# Patient Record
Sex: FEMALE | Race: WHITE | NOT HISPANIC OR LATINO | Employment: OTHER | URBAN - NONMETROPOLITAN AREA
[De-identification: names, ages, dates, MRNs, and addresses within clinical notes are randomized per-mention and may not be internally consistent; named-entity substitution may affect disease eponyms.]

---

## 2023-04-28 ENCOUNTER — HOSPITAL ENCOUNTER (EMERGENCY)
Facility: HOSPITAL | Age: 83
Discharge: ANOTHER HOSPITAL | End: 2023-04-28
Attending: EMERGENCY MEDICINE

## 2023-04-28 ENCOUNTER — APPOINTMENT (EMERGENCY)
Dept: CT IMAGING | Facility: HOSPITAL | Age: 83
End: 2023-04-28

## 2023-04-28 VITALS
DIASTOLIC BLOOD PRESSURE: 100 MMHG | SYSTOLIC BLOOD PRESSURE: 201 MMHG | OXYGEN SATURATION: 97 % | HEIGHT: 61 IN | TEMPERATURE: 97.4 F | BODY MASS INDEX: 31.97 KG/M2 | RESPIRATION RATE: 17 BRPM | HEART RATE: 81 BPM | WEIGHT: 169.31 LBS

## 2023-04-28 DIAGNOSIS — R10.9 ABDOMINAL PAIN: Primary | ICD-10-CM

## 2023-04-28 DIAGNOSIS — K85.90 ACUTE PANCREATITIS WITHOUT INFECTION OR NECROSIS, UNSPECIFIED PANCREATITIS TYPE: ICD-10-CM

## 2023-04-28 DIAGNOSIS — R11.0 NAUSEA: ICD-10-CM

## 2023-04-28 DIAGNOSIS — R74.01 ELEVATED AST (SGOT): ICD-10-CM

## 2023-04-28 LAB
ALBUMIN SERPL BCP-MCNC: 3.8 G/DL (ref 3.5–5)
ALP SERPL-CCNC: 84 U/L (ref 34–104)
ALT SERPL W P-5'-P-CCNC: 45 U/L (ref 7–52)
ANION GAP SERPL CALCULATED.3IONS-SCNC: 12 MMOL/L (ref 4–13)
AST SERPL W P-5'-P-CCNC: 123 U/L (ref 13–39)
BACTERIA UR QL AUTO: ABNORMAL /HPF
BASOPHILS # BLD AUTO: 0.04 THOUSANDS/ΜL (ref 0–0.1)
BASOPHILS NFR BLD AUTO: 0 % (ref 0–1)
BILIRUB SERPL-MCNC: 0.7 MG/DL (ref 0.2–1)
BILIRUB UR QL STRIP: NEGATIVE
BUN SERPL-MCNC: 82 MG/DL (ref 5–25)
CALCIUM SERPL-MCNC: 8.8 MG/DL (ref 8.4–10.2)
CARDIAC TROPONIN I PNL SERPL HS: 8 NG/L
CHLORIDE SERPL-SCNC: 100 MMOL/L (ref 96–108)
CLARITY UR: CLEAR
CO2 SERPL-SCNC: 20 MMOL/L (ref 21–32)
COLOR UR: YELLOW
CREAT SERPL-MCNC: 5.05 MG/DL (ref 0.6–1.3)
EOSINOPHIL # BLD AUTO: 0.04 THOUSAND/ΜL (ref 0–0.61)
EOSINOPHIL NFR BLD AUTO: 0 % (ref 0–6)
ERYTHROCYTE [DISTWIDTH] IN BLOOD BY AUTOMATED COUNT: 13.1 % (ref 11.6–15.1)
GFR SERPL CREATININE-BSD FRML MDRD: 7 ML/MIN/1.73SQ M
GLUCOSE SERPL-MCNC: 179 MG/DL (ref 65–140)
GLUCOSE UR STRIP-MCNC: ABNORMAL MG/DL
HCT VFR BLD AUTO: 28 % (ref 34.8–46.1)
HGB BLD-MCNC: 9.3 G/DL (ref 11.5–15.4)
HGB UR QL STRIP.AUTO: ABNORMAL
IMM GRANULOCYTES # BLD AUTO: 0.05 THOUSAND/UL (ref 0–0.2)
IMM GRANULOCYTES NFR BLD AUTO: 0 % (ref 0–2)
KETONES UR STRIP-MCNC: NEGATIVE MG/DL
LACTATE SERPL-SCNC: 0.8 MMOL/L (ref 0.5–2)
LEUKOCYTE ESTERASE UR QL STRIP: NEGATIVE
LIPASE SERPL-CCNC: 845 U/L (ref 11–82)
LYMPHOCYTES # BLD AUTO: 0.76 THOUSANDS/ΜL (ref 0.6–4.47)
LYMPHOCYTES NFR BLD AUTO: 7 % (ref 14–44)
MCH RBC QN AUTO: 31.3 PG (ref 26.8–34.3)
MCHC RBC AUTO-ENTMCNC: 33.2 G/DL (ref 31.4–37.4)
MCV RBC AUTO: 94 FL (ref 82–98)
MONOCYTES # BLD AUTO: 0.78 THOUSAND/ΜL (ref 0.17–1.22)
MONOCYTES NFR BLD AUTO: 7 % (ref 4–12)
NEUTROPHILS # BLD AUTO: 9.68 THOUSANDS/ΜL (ref 1.85–7.62)
NEUTS SEG NFR BLD AUTO: 86 % (ref 43–75)
NITRITE UR QL STRIP: NEGATIVE
NON-SQ EPI CELLS URNS QL MICRO: ABNORMAL /HPF
NRBC BLD AUTO-RTO: 0 /100 WBCS
PH UR STRIP.AUTO: 7 [PH]
PLATELET # BLD AUTO: 189 THOUSANDS/UL (ref 149–390)
PMV BLD AUTO: 11.7 FL (ref 8.9–12.7)
POTASSIUM SERPL-SCNC: 4.2 MMOL/L (ref 3.5–5.3)
PROT SERPL-MCNC: 7.5 G/DL (ref 6.4–8.4)
PROT UR STRIP-MCNC: ABNORMAL MG/DL
RBC # BLD AUTO: 2.97 MILLION/UL (ref 3.81–5.12)
RBC #/AREA URNS AUTO: ABNORMAL /HPF
SODIUM SERPL-SCNC: 132 MMOL/L (ref 135–147)
SP GR UR STRIP.AUTO: 1.01 (ref 1–1.03)
UROBILINOGEN UR QL STRIP.AUTO: 0.2 E.U./DL
WBC # BLD AUTO: 11.35 THOUSAND/UL (ref 4.31–10.16)
WBC #/AREA URNS AUTO: ABNORMAL /HPF

## 2023-04-28 RX ORDER — FERROUS SULFATE 7.5 MG/0.5
45 SYRINGE (EA) ORAL SEE ADMIN INSTRUCTIONS
COMMUNITY

## 2023-04-28 RX ORDER — BETAMETHASONE DIPROPIONATE 0.05 %
1 OINTMENT (GRAM) TOPICAL AS NEEDED
COMMUNITY

## 2023-04-28 RX ORDER — OMEGA-3S/DHA/EPA/FISH OIL/D3 300MG-1000
400 CAPSULE ORAL 2 TIMES DAILY
COMMUNITY

## 2023-04-28 RX ORDER — LOPERAMIDE HYDROCHLORIDE 2 MG/1
2 CAPSULE ORAL AS NEEDED
COMMUNITY

## 2023-04-28 RX ORDER — CHLORDIAZEPOXIDE HYDROCHLORIDE 5 MG/1
5 CAPSULE, GELATIN COATED ORAL 2 TIMES DAILY
COMMUNITY

## 2023-04-28 RX ORDER — ALLOPURINOL 100 MG/1
100 TABLET ORAL DAILY
COMMUNITY

## 2023-04-28 RX ORDER — NITROGLYCERIN 0.4 MG/1
0.4 TABLET SUBLINGUAL
COMMUNITY

## 2023-04-28 RX ORDER — FAMOTIDINE 10 MG/ML
20 INJECTION, SOLUTION INTRAVENOUS ONCE
Status: COMPLETED | OUTPATIENT
Start: 2023-04-28 | End: 2023-04-28

## 2023-04-28 RX ORDER — EPINEPHRINE 0.3 MG/.3ML
0.15 INJECTION SUBCUTANEOUS ONCE
COMMUNITY

## 2023-04-28 RX ORDER — FAMOTIDINE 20 MG/1
20 TABLET, FILM COATED ORAL DAILY
COMMUNITY

## 2023-04-28 RX ORDER — FLUTICASONE PROPIONATE 50 MCG
2 SPRAY, SUSPENSION (ML) NASAL DAILY
COMMUNITY

## 2023-04-28 RX ORDER — HYDROCHLOROTHIAZIDE 25 MG/1
25 TABLET ORAL DAILY
COMMUNITY

## 2023-04-28 RX ORDER — ONDANSETRON 2 MG/ML
4 INJECTION INTRAMUSCULAR; INTRAVENOUS ONCE
Status: COMPLETED | OUTPATIENT
Start: 2023-04-28 | End: 2023-04-28

## 2023-04-28 RX ORDER — CALCIPOTRIENE 50 UG/G
1 CREAM TOPICAL 2 TIMES DAILY
COMMUNITY

## 2023-04-28 RX ADMIN — FAMOTIDINE 20 MG: 10 INJECTION INTRAVENOUS at 01:37

## 2023-04-28 RX ADMIN — MORPHINE SULFATE 2 MG: 2 INJECTION, SOLUTION INTRAMUSCULAR; INTRAVENOUS at 05:09

## 2023-04-28 RX ADMIN — ONDANSETRON 4 MG: 2 INJECTION INTRAMUSCULAR; INTRAVENOUS at 01:37

## 2023-04-28 RX ADMIN — ONDANSETRON 4 MG: 2 INJECTION INTRAMUSCULAR; INTRAVENOUS at 05:08

## 2023-04-28 NOTE — ED PROVIDER NOTES
History  Chief Complaint   Patient presents with   • Abdominal Pain     Started with increased upper abd pain around 9pm  Pt has hx of hiatal hernia that cannot be operated on  Denies n/v/d  Patient is an 33-year-old female presenting to the emergency department complaining of epigastric abdominal pain with nausea that started around 9 PM, she ate dinner okay without any difficulty, she denies any fever or chills, no sick contacts, no questionable food intake, no difficulty or pain with urination, she reports normal bowel movements without constipation or diarrhea, patient has a known history of a large hiatal hernia that is inoperable, she recently had an AV fistula placed in the left upper extremity for chronic kidney disease, not currently on dialysis          Prior to Admission Medications   Prescriptions Last Dose Informant Patient Reported? Taking? EPINEPHrine (EPIPEN) 0 3 mg/0 3 mL SOAJ   Yes Yes   Sig: Inject 0 15 mg into a muscle once   Magnesium Oxide POWD 4/28/2023  Yes Yes   Sig: Take 400 mg by mouth in the morning 1/4 teaspoon daily   PROBIOTIC PRODUCT PO 4/28/2023  Yes Yes   Sig: Take 0 5 mg by mouth daily in the early morning   allopurinol (ZYLOPRIM) 100 mg tablet 4/28/2023  Yes Yes   Sig: Take 100 mg by mouth daily   betamethasone dipropionate (DIPROSONE) 0 05 % ointment   Yes Yes   Sig: Apply 1 application  topically if needed   calcipotriene (DOVONEX) 0 005 % cream   Yes Yes   Sig: Apply 1 application   topically 2 (two) times a day Monday thru Friday   chlordiazePOXIDE (LIBRIUM) 5 mg capsule 4/28/2023  Yes Yes   Sig: Take 5 mg by mouth 2 (two) times a day   cholecalciferol (VITAMIN D3) 400 units tablet 4/28/2023  Yes Yes   Sig: Take 400 Units by mouth 2 (two) times a day   dextromethorphan-guaifenesin (MUCINEX DM)  MG per 12 hr tablet   Yes Yes   Sig: Take 1 tablet by mouth daily as needed for cough   famotidine (PEPCID) 20 mg tablet 4/28/2023  Yes Yes   Sig: Take 20 mg by mouth daily ferrous sulfate (ALPHONSO-IN-SOL) 75 (15 Fe) mg/mL drops   Yes Yes   Sig: Take 45 mg of iron by mouth see administration instructions Every Monday, Wednesday, Friday   fluticasone (FLONASE) 50 mcg/act nasal spray   Yes Yes   Si sprays into each nostril daily   hydrochlorothiazide (HYDRODIURIL) 25 mg tablet 2023  Yes Yes   Sig: Take 25 mg by mouth daily   loperamide (IMODIUM) 2 mg capsule   Yes Yes   Sig: Take 2 mg by mouth as needed for diarrhea   nitroglycerin (NITROSTAT) 0 4 mg SL tablet   Yes Yes   Sig: Place 0 4 mg under the tongue every 5 (five) minutes as needed for chest pain      Facility-Administered Medications: None       Past Medical History:   Diagnosis Date   • Basal cell carcinoma     left lower eyelid   • Basal cell carcinoma     left nare   • Cataract    • Chronic kidney disease    • Colon cancer ([facility] [address] )    • Diabetes mellitus ([facility] )    • Diverticulitis    • Foot fracture, left    • Hiatal hernia with GERD    • Psoriasis        Past Surgical History:   Procedure Laterality Date   • AV FISTULA PLACEMENT Left    • COLONOSCOPY     • POLYPECTOMY     • SKIN CANCER EXCISION      left lower eyelid   • SKIN CANCER EXCISION      left nare       History reviewed  No pertinent family history  I have reviewed and agree with the history as documented  E-Cigarette/Vaping     E-Cigarette/Vaping Substances     Social History     Tobacco Use   • Smoking status: Never   • Smokeless tobacco: Never   Substance Use Topics   • Alcohol use: Never   • Drug use: Never       Review of Systems   Constitutional: Negative  HENT: Negative  Eyes: Negative  Respiratory: Negative  Cardiovascular: Negative  Gastrointestinal: Positive for abdominal pain and nausea  Endocrine: Negative  Genitourinary: Negative  Musculoskeletal: Negative  Skin: Negative  Allergic/Immunologic: Negative  Neurological: Negative  Hematological: Negative  Psychiatric/Behavioral: Negative          Physical Exam  Physical Exam  Constitutional:       Appearance: She is well-developed  HENT:      Head: Normocephalic and atraumatic  Eyes:      Pupils: Pupils are equal, round, and reactive to light  Cardiovascular:      Rate and Rhythm: Normal rate and regular rhythm  Pulmonary:      Breath sounds: Normal breath sounds  Abdominal:      Palpations: Abdomen is soft  Tenderness: There is abdominal tenderness in the epigastric area  Musculoskeletal:         General: Normal range of motion  Cervical back: Normal range of motion and neck supple  Skin:     General: Skin is warm and dry  Neurological:      Mental Status: She is alert     Psychiatric:         Behavior: Behavior normal          Vital Signs  ED Triage Vitals   Temperature Pulse Respirations Blood Pressure SpO2   04/28/23 0120 04/28/23 0120 04/28/23 0120 04/28/23 0200 04/28/23 0120   (!) 97 4 °F (36 3 °C) 66 18 167/74 99 %      Temp Source Heart Rate Source Patient Position - Orthostatic VS BP Location FiO2 (%)   04/28/23 0120 04/28/23 0120 04/28/23 0120 04/28/23 0120 --   Temporal Monitor Lying Right arm       Pain Score       04/28/23 0120       7           Vitals:    04/28/23 0120 04/28/23 0200 04/28/23 0512   BP:  167/74 (!) 201/100   Pulse: 66 67 81   Patient Position - Orthostatic VS: Lying  Sitting         Visual Acuity      ED Medications  Medications   ondansetron (ZOFRAN) injection 4 mg (4 mg Intravenous Given 4/28/23 0137)   Famotidine (PF) (PEPCID) injection 20 mg (20 mg Intravenous Given 4/28/23 0137)   ondansetron (ZOFRAN) injection 4 mg (4 mg Intravenous Given 4/28/23 0508)   morphine injection 2 mg (2 mg Intravenous Given 4/28/23 0509)       Diagnostic Studies  Results Reviewed     Procedure Component Value Units Date/Time    Urine Microscopic [[id number]]  (Abnormal) Collected: 04/28/23 0314    Lab Status: Final result Specimen: Urine, Clean Catch Updated: 04/28/23 0525     RBC, UA 0-1 /hpf      WBC, UA 4-10 /hpf Epithelial Cells Occasional /hpf      Bacteria, UA Occasional /hpf     UA w Reflex to Microscopic w Reflex to Culture [[id number]]  (Abnormal) Collected: 04/28/23 0314    Lab Status: Final result Specimen: Urine, Clean Catch Updated: 04/28/23 0426     Color, UA Yellow     Clarity, UA Clear     Specific Gravity, UA 1 015     pH, UA 7 0     Leukocytes, UA Negative     Nitrite, UA Negative     Protein,  (2+) mg/dl      Glucose,  (1/10%) mg/dl      Ketones, UA Negative mg/dl      Urobilinogen, UA 0 2 E U /dl      Bilirubin, UA Negative     Occult Blood, UA Small    Lipase [[id number]]  (Abnormal) Collected: 04/28/23 0136    Lab Status: Final result Specimen: Blood from Arm, Right Updated: 04/28/23 0342     Lipase 845 u/L     Comprehensive metabolic panel [[id number]]  (Abnormal) Collected: 04/28/23 0136    Lab Status: Final result Specimen: Blood from Arm, Right Updated: 04/28/23 0241     Sodium 132 mmol/L      Potassium 4 2 mmol/L      Chloride 100 mmol/L      CO2 20 mmol/L      ANION GAP 12 mmol/L      BUN 82 mg/dL      Creatinine 5 05 mg/dL      Glucose 179 mg/dL      Calcium 8 8 mg/dL       U/L      ALT 45 U/L      Alkaline Phosphatase 84 U/L      Total Protein 7 5 g/dL      Albumin 3 8 g/dL      Total Bilirubin 0 70 mg/dL      eGFR 7 ml/min/1 73sq m     Narrative:      National Kidney Disease Foundation guidelines for Chronic Kidney Disease (CKD):   •  Stage 1 with normal or high GFR (GFR > 90 mL/min/1 73 square meters)  •  Stage 2 Mild CKD (GFR = 60-89 mL/min/1 73 square meters)  •  Stage 3A Moderate CKD (GFR = 45-59 mL/min/1 73 square meters)  •  Stage 3B Moderate CKD (GFR = 30-44 mL/min/1 73 square meters)  •  Stage 4 Severe CKD (GFR = 15-29 mL/min/1 73 square meters)  •  Stage 5 End Stage CKD (GFR <15 mL/min/1 73 square meters)  Note: GFR calculation is accurate only with a steady state creatinine    HS Troponin 0hr (reflex protocol) [[id number]]  (Normal) Collected: 04/28/23 0136    Lab Status: Final result Specimen: Blood from Arm, Right Updated: 04/28/23 0240     hs TnI 0hr 8 ng/L     Lactic acid, plasma (w/reflex if result > 2 0) [[id number]]  (Normal) Collected: 04/28/23 0136    Lab Status: Final result Specimen: Blood from Arm, Right Updated: 04/28/23 0237     LACTIC ACID 0 8 mmol/L     Narrative:      Result may be elevated if tourniquet was used during collection  CBC and differential [[id number]]  (Abnormal) Collected: 04/28/23 0136    Lab Status: Final result Specimen: Blood from Arm, Right Updated: 04/28/23 0221     WBC 11 35 Thousand/uL      RBC 2 97 Million/uL      Hemoglobin 9 3 g/dL      Hematocrit 28 0 %      MCV 94 fL      MCH 31 3 pg      MCHC 33 2 g/dL      RDW 13 1 %      MPV 11 7 fL      Platelets 748 Thousands/uL      nRBC 0 /100 WBCs      Neutrophils Relative 86 %      Immat GRANS % 0 %      Lymphocytes Relative 7 %      Monocytes Relative 7 %      Eosinophils Relative 0 %      Basophils Relative 0 %      Neutrophils Absolute 9 68 Thousands/µL      Immature Grans Absolute 0 05 Thousand/uL      Lymphocytes Absolute 0 76 Thousands/µL      Monocytes Absolute 0 78 Thousand/µL      Eosinophils Absolute 0 04 Thousand/µL      Basophils Absolute 0 04 Thousands/µL                  CT abdomen pelvis wo contrast   Final Result by Homero Silva MD (04/28 0446)      1  Large hiatal hernia containing the proximal to mid stomach and mesenteric fat  2   No calcified gallstones or abnormal gallbladder wall thickening  Minimal nonspecific hazy inflammatory stranding adjacent to the gallbladder neck region is seen  Consider right upper quadrant ultrasound for better evaluation of the gallbladder  No    significant biliary ductal dilatation  3   No evidence for bowel obstruction, inflammation, appendicitis, obstructive uropathy, free air, or free fluid  Other ancillary findings detailed above                      Procedures  ECG 12 Lead Documentation Only    Date/Time: 4/28/2023 1:54 AM  Performed by: Sawyer Nichole DO  Authorized by: Sawyer Nichole DO     Indications / Diagnosis:  Epigastric pain  ECG reviewed by me, the ED Provider: yes    Patient location:  ED  Previous ECG:     Comparison to cardiac monitor: Yes    Interpretation:     Interpretation: non-specific    Rate:     ECG rate:  71    ECG rate assessment: normal    Rhythm:     Rhythm: sinus rhythm    QRS:     QRS intervals:   Wide  Conduction:     Conduction: abnormal      Abnormal conduction: incomplete RBBB    ST segments:     ST segments:  Normal  T waves:     T waves: normal               ED Course                                             Medical Decision Making  Patient is an 18-year-old female, presenting to the emergency department complaining of upper abdominal pain with nausea and vomiting, inability to tolerate p o  intake, she has a known history of a large hiatal hernia that she reports was deemed an operable, she denies any sick contacts, no fever or chills, she does have a history of chronic kidney disease and recently had an AV fistula placed in the left upper arm, on exam patient does have some tenderness in the epigastric region, she appears ill on exam, work-up in the emergency department consistent with likely pancreatitis with elevated lipase, although no evidence on CT scan patient's symptoms consistent with pancreatitis, admission was advised, patient requested transfer to [facility] in [place] which is where she typically receives her care, and reported that she be willing to have her daughter drive her there, therefore a transfer request was placed and packed, I did speak to the hospitalist at [facility] in [place], Dr Ana Longoria accepts patient for transfer and is in agreement with private transport as patient is currently stable, patient provided with disc of imaging studies and copies of labs, advised to return if symptoms worsen or any additional concerns, patient acknowledges understanding and agreement with this plan    Abdominal pain: acute illness or injury  Acute pancreatitis without infection or necrosis, unspecified pancreatitis type: acute illness or injury  Elevated AST (SGOT): acute illness or injury  Nausea: acute illness or injury  Amount and/or Complexity of Data Reviewed  Labs: ordered  Radiology: ordered  Risk  Prescription drug management  Decision regarding hospitalization  Disposition  Final diagnoses:   Abdominal pain   Nausea   Acute pancreatitis without infection or necrosis, unspecified pancreatitis type   Elevated AST (SGOT)     Time reflects when diagnosis was documented in both MDM as applicable and the Disposition within this note     Time User Action Codes Description Comment    4/28/2023  5:36 AM Geeta Tovar Add [R10 9] Abdominal pain     4/28/2023  5:36 AM PolkathrinezGeeta lacey Add [R11 0] Nausea     4/28/2023  5:36 AM PolGeeta falcon Add [K85 90] Acute pancreatitis without infection or necrosis, unspecified pancreatitis type     4/28/2023  5:37 AM Geeta Tovar Add [R74 01] Elevated AST (SGOT)       ED Disposition     ED Disposition   Transfer to Another [address] [place] [address]    Condition   --    Date/Time   Fri Apr 28, 2023  5:36 AM    Comment   Hiro Braun should be transferred out to [address] [place] [address] in [place] [facility] Documentation    Aneesh Dillon Most Recent Value   Patient Condition The patient has been stabilized such that within reasonable medical probability, no material deterioration of the patient condition or the condition of the unborn child(keisha) is likely to result from the transfer   Reason for Transfer Level of Care needed not available at this facility, Patient/Family request   Benefits of Transfer Specialized equipment and/or services available at the receiving facility (Include comment)________________________   Risks of Transfer Potential for delay in receiving treatment, Potential deterioration of medical condition   Accepting Physician Dr Rosina Sepulveda Name, Donovan Pink [place]    (Name & Tel number) Chavez Pink by [place] and Unit #) Daughter   Sending MD Guevara Divers   Provider Certification General risk, such as traffic hazards, adverse weather conditions, rough terrain or turbulence, possible failure of equipment (including vehicle or aircraft), or consequences of actions of persons outside the control of the transport personnel, Risk of worsening condition      RN Documentation    [address] Janina Monroy Name, [address] [place] [address]    (Name & Tel number) Chavez Pink by [place] and Unit #) Daughter      Follow-up Information    None         Discharge Medication List as of 4/28/2023  6:14 AM      CONTINUE these medications which have NOT CHANGED    Details   allopurinol (ZYLOPRIM) 100 mg tablet Take 100 mg by mouth daily, Historical Med      betamethasone dipropionate (DIPROSONE) 0 05 % ointment Apply 1 application  topically if needed, Historical Med      calcipotriene (DOVONEX) 0 005 % cream Apply 1 application   topically 2 (two) times a day Monday thru Friday, Historical Med      chlordiazePOXIDE (LIBRIUM) 5 mg capsule Take 5 mg by mouth 2 (two) times a day, Historical Med      cholecalciferol (VITAMIN D3) 400 units tablet Take 400 Units by mouth 2 (two) times a day, Historical Med      dextromethorphan-guaifenesin (MUCINEX DM)  MG per 12 hr tablet Take 1 tablet by mouth daily as needed for cough, Historical Med      EPINEPHrine (EPIPEN) 0 3 mg/0 3 mL SOAJ Inject 0 15 mg into a muscle once, Historical Med      famotidine (PEPCID) 20 mg tablet Take 20 mg by mouth daily, Historical Med      ferrous sulfate (ALPHONSO-IN-SOL) 75 (15 Fe) mg/mL drops Take 45 mg of iron by mouth see administration instructions Every Monday, Wednesday, Friday, Historical Med      fluticasone (FLONASE) 50 mcg/act nasal spray 2 sprays into each nostril daily, Historical Med      hydrochlorothiazide (HYDRODIURIL) 25 mg tablet Take 25 mg by mouth daily, Historical Med      loperamide (IMODIUM) 2 mg capsule Take 2 mg by mouth as needed for diarrhea, Historical Med      Magnesium Oxide POWD Take 400 mg by mouth in the morning 1/4 teaspoon daily, Historical Med      nitroglycerin (NITROSTAT) 0 4 mg SL tablet Place 0 4 mg under the tongue every 5 (five) minutes as needed for chest pain, Historical Med      PROBIOTIC PRODUCT PO Take 0 5 mg by mouth daily in the early morning, Historical Med             No discharge procedures on file      PDMP Review     None          ED Provider  Electronically Signed by           Jurgen Rodney DO  04/29/23 4330

## 2023-04-28 NOTE — EMTALA/ACUTE CARE TRANSFER
[address] [facility] [address]  [facility] [address]  Dept: [phone]      EMTALA TRANSFER CONSENT    NAME Kyle Chand                                         1940                              MRN [id number]    I have been informed of my rights regarding examination, treatment, and transfer   by Dr Linn Mancilla DO    Benefits: Specialized equipment and/or services available at the receiving facility (Include comment)________________________    Risks: Potential for delay in receiving treatment, Potential deterioration of medical condition      Consent for Transfer:  I acknowledge that my medical condition has been evaluated and explained to me by the emergency department physician or other qualified medical person and/or my attending physician, who has recommended that I be transferred to the service of  Accepting Physician: Dr Saeid Way at [address] [place] [address] Name, [address] : [address]o, [address] [place] [address]  The above potential benefits of such transfer, the potential risks associated with such transfer, and the probable risks of not being transferred have been explained to me, and I fully understand them  The doctor has explained that, in my case, the benefits of transfer outweigh the risks  I agree to be transferred  I authorize the performance of emergency medical procedures and treatments upon me in both transit and upon arrival at the receiving facility  Additionally, I authorize the release of any and all medical records to the receiving facility and request they be transported with me, if possible  I understand that the safest mode of transportation during a medical emergency is an ambulance and that the Hospital advocates the use of this mode of transport   Risks of traveling to the receiving facility by car, including absence of medical control, life sustaining equipment, such as oxygen, and medical personnel has been explained to me and I fully understand them  (JERRY CORRECT BOX BELOW)  [  ]  I consent to the stated transfer and to be transported by ambulance/helicopter  [  ]  I consent to the stated transfer, but refuse transportation by ambulance and accept full responsibility for my transportation by car  I understand the risks of non-ambulance transfers and I exonerate the Hospital and its staff from any deterioration in my condition that results from this refusal     X___________________________________________    DATE  23  TIME________  Signature of patient or legally responsible individual signing on patient behalf           RELATIONSHIP TO PATIENT_________________________          Provider Certification    NAME Kojo Jefferson                                        [facility] 1940                              MRN [id number]    A medical screening exam was performed on the above named patient  Based on the examination:    Condition Necessitating Transfer The primary encounter diagnosis was Abdominal pain  Diagnoses of Nausea, Acute pancreatitis without infection or necrosis, unspecified pancreatitis type, and Elevated AST (SGOT) were also pertinent to this visit      Patient Condition: The patient has been stabilized such that within reasonable medical probability, no material deterioration of the patient condition or the condition of the unborn child(keisha) is likely to result from the transfer    Reason for Transfer: Level of Care needed not available at this facility, Patient/Family request    Transfer Requirements: [address] [facility]   · Space available and qualified personnel available for treatment as acknowledged by Sarah Solorzano  · Agreed to accept transfer and to provide appropriate medical treatment as acknowledged by       Dr Linda Blount  · Appropriate medical records of the examination and treatment of the patient are provided at the time of transfer   [address] _______  · Transfer will be performed by qualified personnel from [facility]  and appropriate transfer equipment as required, including the use of necessary and appropriate life support measures  Provider Certification: I have examined the patient and explained the following risks and benefits of being transferred/refusing transfer to the patient/family:  General risk, such as traffic hazards, adverse weather conditions, rough terrain or turbulence, possible failure of equipment (including vehicle or aircraft), or consequences of actions of persons outside the control of the transport personnel, Risk of worsening condition      Based on these reasonable risks and benefits to the patient and/or the unborn child(keisha), and based upon the information available at the time of the patient’s examination, I certify that the medical benefits reasonably to be expected from the provision of appropriate medical treatments at another medical facility outweigh the increasing risks, if any, to the individual’s medical condition, and in the case of labor to the unborn child, from effecting the transfer      X____________________________________________ DATE 04/28/23        TIME_______      ORIGINAL - SEND TO MEDICAL RECORDS   COPY - SEND WITH PATIENT DURING TRANSFER

## 2023-05-01 LAB
ATRIAL RATE: 71 BPM
P AXIS: 34 DEGREES
PR INTERVAL: 184 MS
QRS AXIS: 7 DEGREES
QRSD INTERVAL: 104 MS
QT INTERVAL: 404 MS
QTC INTERVAL: 439 MS
T WAVE AXIS: 48 DEGREES
VENTRICULAR RATE: 71 BPM

## 2023-05-18 ENCOUNTER — CLINICAL SUPPORT (OUTPATIENT)
Dept: NUTRITION | Facility: CLINIC | Age: 83
End: 2023-05-18

## 2023-05-18 VITALS — WEIGHT: 158.4 LBS | BODY MASS INDEX: 29.93 KG/M2

## 2023-05-18 DIAGNOSIS — N18.5 CHRONIC KIDNEY DISEASE (CKD), ACTIVE MEDICAL MANAGEMENT WITHOUT DIALYSIS, STAGE 5 (HCC): ICD-10-CM

## 2023-05-18 DIAGNOSIS — E11.69 TYPE 2 DIABETES MELLITUS WITH OTHER SPECIFIED COMPLICATION, WITHOUT LONG-TERM CURRENT USE OF INSULIN (HCC): ICD-10-CM

## 2023-05-18 NOTE — PROGRESS NOTES
" Nutrition Assessment Form    Patient Name: Terri Woodard    YOB: 1940    Sex: Female     Assessment Date: 5/18/2023  Start Time: 10 AM Stop Time: 11:15 AM Total Minutes: 75 min     Data:  Present at session: self and daughter Viviane Angelucci   Parent/Patient Concerns/reason for visit: 'I dont know what to eat  \"   Medical Dx/Reason for Referral: Acute pancreatitis, ESRD not on dialysis, type 2 DM   Past Medical History:   Diagnosis Date   • Basal cell carcinoma     left lower eyelid   • Basal cell carcinoma     left nare   • Cataract    • Chronic kidney disease    • Colon cancer ([facility] [address] )    • Diabetes mellitus ([facility] [address] )    • Diverticulitis    • Foot fracture, left    • Hiatal hernia with GERD    • Psoriasis     Large hiatal hernia reportedly will not operate on this    Recent acute pancreatitis which pt reports she occasionally still has abdominal pain   Current Outpatient Medications   Medication Sig Dispense Refill   • allopurinol (ZYLOPRIM) 100 mg tablet Take 100 mg by mouth daily     • betamethasone dipropionate (DIPROSONE) 0 05 % ointment Apply 1 application  topically if needed     • calcipotriene (DOVONEX) 0 005 % cream Apply 1 application   topically 2 (two) times a day Monday thru Friday     • chlordiazePOXIDE (LIBRIUM) 5 mg capsule Take 5 mg by mouth 2 (two) times a day     • cholecalciferol (VITAMIN D3) 400 units tablet Take 400 Units by mouth 2 (two) times a day     • dextromethorphan-guaifenesin (MUCINEX DM)  MG per 12 hr tablet Take 1 tablet by mouth daily as needed for cough     • EPINEPHrine (EPIPEN) 0 3 mg/0 3 mL SOAJ Inject 0 15 mg into a muscle once     • famotidine (PEPCID) 20 mg tablet Take 20 mg by mouth daily     • ferrous sulfate (ALPHONSO-IN-SOL) 75 (15 Fe) mg/mL drops Take 45 mg of iron by mouth see administration instructions Every Monday, Wednesday, Friday     • fluticasone (FLONASE) 50 mcg/act nasal spray 2 sprays into each nostril daily     • hydrochlorothiazide (HYDRODIURIL) 25 mg " "tablet Take 25 mg by mouth daily     • loperamide (IMODIUM) 2 mg capsule Take 2 mg by mouth as needed for diarrhea     • Magnesium Oxide POWD Take 400 mg by mouth in the morning 1/4 teaspoon daily     • nitroglycerin (NITROSTAT) 0 4 mg SL tablet Place 0 4 mg under the tongue every 5 (five) minutes as needed for chest pain     • PROBIOTIC PRODUCT PO Take 0 5 mg by mouth daily in the early morning       No current facility-administered medications for this visit  Additional Meds/Supplements: Reports BG has been controlled by diet, no DM meds   Special Learning Needs/barriers to learning/any new barriers n/a   Height: HC Readings from Last 5 Encounters:   No data found for [facility]       Weight: Wt Readings from Last 10 Encounters:   04/28/23 76 8 kg (169 lb 5 oz)     Estimated body mass index is 31 99 kg/m² as calculated from the following:    Height as of 4/28/23: 5' 1\" (1 549 m)  Weight as of 4/28/23: 76 8 kg (169 lb 5 oz)     Recent Weight Change: [x]Yes     []No  Amount: Unintentional weight loss from ~167lb due to recent pancreatitis      Energy Needs: [place] Equation: 1647 kcal (mifflin x 1 5 activity)   Allergies   Allergen Reactions   • Ascorbic Acid Anaphylaxis     Reports all fresh fruit allergy   • Flurbiprofen Hives   • Iodinated Contrast Media Hives   • Shrimp (Diagnostic) - Food Allergy Hives   • Tetanus Toxoid Hives   • Food Hives     Crab meat      or intolerances Reports allergic to all kinds of fruit, reports cannot have any form of fruit    Avoids nuts due to hx diverticulitis   Social History     Substance and Sexual Activity   Alcohol Use Never    No alcohol intake   Social History     Tobacco Use   Smoking Status Never   Smokeless Tobacco Never       Who shops? daughter patience   Who cooks/cooking methods/Eating out/take out habits   patient  Cooking methods: bakes, would like to get an air fryer    No take out or dine out meals   Exercise: Performs ADLs     Other: ie: Sleep habits/ " "stress level/ work habits household-lives with ?/ food security Did not describe   Prior Nutritional Counseling? [x]Yes     []No  When: During recent hospitalization for pancreatitis \"I can't eat honey or tomato products  \"     Why:         Diet Hx:  Breakfast: Diet: Was counting carb and sodium though now that she is sick she has been not doing this  Very limited/unsure what to eat    Cheerios with 1% milk   Lunch: Sweet potato with chicken 4 oz and asparagus/butter  Mashed potatoes        Dinner: 2 pieces toast with 1 egg  Or english muffin with 1 piece toast        Snacks: AM -   PM - Instant pudding 1/2 cup  HS - another pudding or crackers and pretzels    Water is primary beverage, sometimes organic hot tea black   Other Notes/ Initial Assessment:  Pt reports BG is in the 90s every morning at home  Yohannes Valle reports pt's A1c in December 2020 A1c 7 8%, June 29th 2021 A1c 7 1% January 23rd 2022 A1c 6 9%, July 19th 2022 A1c 7 3%  BG fairly well controlled without use of DM meds  Pt with recent pancreatitis ontop of CKD 5 not on dialysis, has fistula in her arm in the event she would need dialysis though currently not on dialysis tx  Pt says she does not know what to eat in order to balance all of these different nutritional needs  Pt is losing weight due to not knowing what to eat and also have some abdominal pain with certain foods she ate such as whole eggs, salmon cakes, etc           Nutrition Diagnosis:   Altered Nutrition-Related Laboratory values  related to Kidney, liver, cardiac, endocrine, neurologic, and/or pulmonary dysfunction as evidenced by  Abnormal BUN, Cr, K, Phosphorus, GFR (kidney disorders) eGFR 8 on 4/30/23       Any change or new dx since previous visit:     Medical Nutrition Therapy Intervention:  [x]Individualized Meal Plan--Discussed low phosphorus/low potassium diet with pt, modified to include low fat restriction due to recent pancreatitis   Discussed appropriate " "grain/protein/vegetable/beverage options which were pts primary concern  Discussed use of modest added sugars such as grape jelly \"which I dont get an allergic reaction from\" or rice crispie treats as BG levels are currently well controlled and is on a quite restrictive diet  Discussed limiting added fats such as butter, tub margarine, peanut butters though can include these in her diet  []Understanding Lab Values   []Basic Pathophysiology of Disease []Food/Medication Interactions   []Food Diary []Exercise   []Lifestyle/Behavior Modification Techniques []Medication, Mechanism of Action   [x]Label Reading: Discussed reading ingredients list for added phosphorus/phosphates and to avoid/limit these foods  []Self Blood Glucose Monitoring   []Weight/BMI Goals: gain/lose/maintain [x]Other - handouts provided: Phosphorus Content of Foods, Potassium Content of Foods, Patients with CKD5 not on dialysis handout, Pancreatitis Nutrition Therapy          Comprehension: []Excellent  [x]Very Good  []Good  []Fair   []Poor    Receptivity: []Excellent  [x]Very Good  []Good  []Fair   []Poor    Expected Compliance: []Excellent  [x]Very Good  []Good  []Fair   []Poor        Goals (initial)/ Progress made on previous goals/new goals:  1  Pt to maintain current weight upon follow up  2  Pt to be able to recall low phosphorus food examples upon next follow up  3  Pt to be able to recall low potassium food examples upon next follow up  No follow-ups on file    Labs:  CMP  Lab Results   Component Value Date    K 4 2 04/28/2023     04/28/2023    CO2 20 (L) 04/28/2023    BUN 82 (H) 04/28/2023    CREATININE 5 05 (H) 04/28/2023    CALCIUM 8 8 04/28/2023     (H) 04/28/2023    ALT 45 04/28/2023    ALKPHOS 84 04/28/2023    EGFR 7 04/28/2023       BMP  Lab Results   Component Value Date    CALCIUM 8 8 04/28/2023    K 4 2 04/28/2023    CO2 20 (L) 04/28/2023     04/28/2023    BUN 82 (H) 04/28/2023    CREATININE 5 05 (H) " 04/28/2023       Lipids  No results found for: CHOL  No results found for: HDL  No results found for: LDLCALC  No results found for: TRIG  No results found for: CHOLHDL    Hemoglobin A1C  No results found for: HGBA1C    Fasting Glucose  No results found for: GLUF    Insulin     Thyroid  No results found for: TSH, G2YIRKX, V9THRWM, THYROIDAB    Hepatic Function Panel  Lab Results   Component Value Date    ALT 45 04/28/2023     (H) 04/28/2023    ALKPHOS 84 04/28/2023       Celiac Disease Antibody Panel  No results found for: ENDOMYSIAL IGA, GLIADIN IGA, GLIADIN IGG, IGA, TISSUE TRANSGLUT AB, TTG IGA   Iron  No results found for: IRON, TIBC, [address], RD, LDN  [facility] CLINICAL NUTRITION SERVICES  [address] [address]

## 2023-06-15 ENCOUNTER — TELEPHONE (OUTPATIENT)
Dept: NUTRITION | Facility: HOSPITAL | Age: 83
End: 2023-06-15

## 2023-06-15 NOTE — TELEPHONE ENCOUNTER
Pt unable to make it to appointment with author today, per daughter Arnav Boyer reportedly pt is having another pancreatic flare up and not feeling well  Arnav Boyer reports pt had a variety of foods she was questioning if she could have  Asked daughter to send these via email to give direction as was unable to reschedule appointment for another 1 mo out  Replied to email with suggestions for foods including snack options  Will discuss further at next follow up appointment

## 2023-07-20 ENCOUNTER — CLINICAL SUPPORT (OUTPATIENT)
Dept: NUTRITION | Facility: CLINIC | Age: 83
End: 2023-07-20
Payer: MEDICARE

## 2023-07-20 VITALS — WEIGHT: 156.8 LBS | BODY MASS INDEX: 29.63 KG/M2

## 2023-07-20 DIAGNOSIS — E11.22 TYPE 2 DIABETES MELLITUS WITH STAGE 5 CHRONIC KIDNEY DISEASE NOT ON CHRONIC DIALYSIS, WITHOUT LONG-TERM CURRENT USE OF INSULIN (HCC): ICD-10-CM

## 2023-07-20 DIAGNOSIS — N18.5 TYPE 2 DIABETES MELLITUS WITH STAGE 5 CHRONIC KIDNEY DISEASE NOT ON CHRONIC DIALYSIS, WITHOUT LONG-TERM CURRENT USE OF INSULIN (HCC): ICD-10-CM

## 2023-07-20 PROCEDURE — 97803 MED NUTRITION INDIV SUBSEQ: CPT | Performed by: DIETITIAN, REGISTERED

## 2023-07-20 NOTE — PROGRESS NOTES
Nutrition Assessment Form    Patient Name: Karla Rm    YOB: 1940    Sex: Female     Assessment Date: 7/20/2023  Start Time: 9:25 AM Stop Time: 10 AM Total Minutes: 35 min     Data:  Present at session: self and daughter Sandy Navarrete   Parent/Patient Concerns/reason for visit: "I can't figure out what else I can eat."   Medical Dx/Reason for Referral: Acute pancreatitis, ESRD not on dialysis, type 2 DM   Past Medical History:   Diagnosis Date   • Basal cell carcinoma     left lower eyelid   • Basal cell carcinoma     left nare   • Cataract    • Chronic kidney disease    • Colon cancer ([address])    • Diabetes mellitus ([address])    • Diverticulitis    • Foot fracture, left    • Hiatal hernia with GERD    • Psoriasis     Large hiatal hernia reportedly will not operate on this    Recent acute pancreatitis which pt reports she occasionally still has abdominal pain    Pt says possibility of starting dialysis soon though she is contemplating if she wants to do this   Current Outpatient Medications   Medication Sig Dispense Refill   • allopurinol (ZYLOPRIM) 100 mg tablet Take 100 mg by mouth daily     • betamethasone dipropionate (DIPROSONE) 0.05 % ointment Apply 1 application. topically if needed     • calcipotriene (DOVONEX) 0.005 % cream Apply 1 application.  topically 2 (two) times a day Monday thru Friday     • chlordiazePOXIDE (LIBRIUM) 5 mg capsule Take 5 mg by mouth 2 (two) times a day     • cholecalciferol (VITAMIN D3) 400 units tablet Take 400 Units by mouth 2 (two) times a day     • dextromethorphan-guaifenesin (MUCINEX DM)  MG per 12 hr tablet Take 1 tablet by mouth daily as needed for cough     • EPINEPHrine (EPIPEN) 0.3 mg/0.3 mL SOAJ Inject 0.15 mg into a muscle once     • famotidine (PEPCID) 20 mg tablet Take 20 mg by mouth daily     • ferrous sulfate (ALPHONSO-IN-SOL) 75 (15 Fe) mg/mL drops Take 45 mg of iron by mouth see administration instructions Every Monday, Wednesday, Friday     • fluticasone (FLONASE) 50 mcg/act nasal spray 2 sprays into each nostril daily     • hydrochlorothiazide (HYDRODIURIL) 25 mg tablet Take 25 mg by mouth daily     • loperamide (IMODIUM) 2 mg capsule Take 2 mg by mouth as needed for diarrhea     • Magnesium Oxide POWD Take 400 mg by mouth in the morning 1/4 teaspoon daily     • nitroglycerin (NITROSTAT) 0.4 mg SL tablet Place 0.4 mg under the tongue every 5 (five) minutes as needed for chest pain     • PROBIOTIC PRODUCT PO Take 0.5 mg by mouth daily in the early morning       No current facility-administered medications for this visit. Additional Meds/Supplements: Reports BG has been controlled by diet, no DM meds   Special Learning Needs/barriers to learning/any new barriers n/a   Height: HC Readings from Last 5 Encounters:   [facility].      Weight: Wt Readings from Last 10 Encounters:   05/18/23 71.8 kg (158 lb 6.4 oz)   04/28/23 76.8 kg (169 lb 5 oz)     Estimated body mass index is 29.93 kg/m² as calculated from the following:    Height as of 4/28/23: 5' 1" (1.549 m). Weight as of 5/18/23: 71.8 kg (158 lb 6.4 oz). Recent Weight Change: []Yes     [x]No  Amount: Pt's weight appears stable at this time, only 2lb weight loss since initial appointment 5/18/23 158lb. Also with CKD 5, may see fluid related weight shifts.      Previous unintentional weight loss from ~167lb due to recent pancreatitis      Energy Needs: [address] Equation: 1647 kcal (mifflin x 1.5 activity)   Allergies   Allergen Reactions   • Ascorbic Acid Anaphylaxis     Reports all fresh fruit allergy   • Flurbiprofen Hives   • Iodinated Contrast Media Hives   • Shrimp (Diagnostic) - Food Allergy Hives   • Tetanus Toxoid Hives   • Food Hives     Crab meat      or intolerances Reports allergic to all kinds of fruit, reports cannot have any form of fruit    Avoids nuts due to hx diverticulitis   Social History     Substance and Sexual Activity   Alcohol Use Never    No alcohol intake Social History     Tobacco Use   Smoking Status Never   Smokeless Tobacco Never       Who shops? daughter patience   Who cooks/cooking methods/Eating out/take out habits   patient  Cooking methods: bakes, would like to get an air fryer    No take out or dine out meals   Exercise: Performs ADLs     Other: ie: Sleep habits/ stress level/ work habits household-lives with ?/ food security Did not describe   Prior Nutritional Counseling? [x]Yes     []No  When: During recent hospitalization for pancreatitis "I can't eat honey or tomato products."     Why:         Diet Hx:  Breakfast: Diet: Low fat, small meals    Cheerios with 1% milk   Lunch: Sweet potato with chicken 4 oz and asparagus/butter  Mashed potatoes        Dinner: Chicken and dumplings with carrots       Snacks: AM -   PM - Instant pudding 1/2 cup or shortbread cookies 2  HS - another pudding or crackers and pretzels    Water is primary beverage, sometimes organic hot tea black   Other Notes/ Initial Assessment:  Pt reports BG is in the 90s every morning at home. Iliana Eldridge reports pt's A1c in December 2020 A1c 7.8%, June 29th 2021 A1c 7.1% January 23rd 2022 A1c 6.9%, July 19th 2022 A1c 7.3%. BG fairly well controlled without use of DM meds. Pt with recent pancreatitis ontop of CKD 5 not on dialysis, has fistula in her arm in the event she would need dialysis though currently not on dialysis tx. Pt says she does not know what to eat in order to balance all of these different nutritional needs. Pt is losing weight due to not knowing what to eat and also have some abdominal pain with certain foods she ate such as whole eggs, salmon cakes, etc.     Follow up:  Pt with slight weight decrease since initial appointment though nonsignificant.  Limited variety in her diet due to not being certain what to eat with various food restrictions, prescribed a low fat, low phosphorus, low sodium diet, modest potassium, modest added sugars and also with various food intolerances/allergies. Pt brought in various food packages/labels to see if these were foods she could eat. Did have recent pancreatitis flare and is scared she will have more. Nutrition Diagnosis:   Altered Nutrition-Related Laboratory values  related to Kidney, liver, cardiac, endocrine, neurologic, and/or pulmonary dysfunction as evidenced by  Abnormal BUN, Cr, K, Phosphorus, GFR (kidney disorders) eGFR 8 on 4/30/23       Any change or new dx since previous visit:     Medical Nutrition Therapy Intervention:  [x]Individualized Meal Plan--Reviewed variety of food items she brought in. Would limit shortbread cookies due to added phosphates, consider homemade rice pudding or tapioca pudding made with rice milk and topped with fat free cool whip instead or homemade oatmeal cookies. Consider homemade gravy with low sodium chicken broth and wondraflour to thicken instead of pre-made gravies. Use PB2 powdered peanut butter instead of reduced fat jif peanut butter. Can continue with rice crispy treats. []Understanding Lab Values   []Basic Pathophysiology of Disease []Food/Medication Interactions   []Food Diary []Exercise   []Lifestyle/Behavior Modification Techniques []Medication, Mechanism of Action   [x]Label Reading: Reviewed reading ingredients list for added phosphorus/phosphates and to avoid/limit these foods. []Self Blood Glucose Monitoring   [x]Weight/BMI Goals: Goal of weight maintenance due to pt's restricted diet, pancreatitis flares. BMI 29 appropriate given advanced age.   [x]Other - handouts previously provided: Phosphorus Content of Foods, Potassium Content of Foods, Patients with CKD5 not on dialysis handout, Pancreatitis Nutrition Therapy          Comprehension: []Excellent  []Very Good  [x]Good  []Fair   []Poor    Receptivity: []Excellent  [x]Very Good  []Good  []Fair   []Poor    Expected Compliance: []Excellent  [x]Very Good  []Good  []Fair   []Poor        Goals (initial)/ Progress made on previous goals/new goals:  1. Pt to maintain current weight upon follow up. 2. Pt to be able to recall low phosphorus food examples upon next follow up. 3. Pt to switch to low phosphorus/low potassium dessert/snack options upon follow up. No follow-ups on file.   Labs:  CMP  Lab Results   Component Value Date    K 4.2 04/28/2023     04/28/2023    CO2 20 (L) 04/28/2023    BUN 82 (H) 04/28/2023    CREATININE 5.05 (H) 04/28/2023    CALCIUM 8.8 04/28/2023     (H) 04/28/2023    ALT 45 04/28/2023    ALKPHOS 84 04/28/2023    EGFR 7 04/28/2023       BMP  Lab Results   Component Value Date    CALCIUM 8.8 04/28/2023    K 4.2 04/28/2023    CO2 20 (L) 04/28/2023     04/28/2023    BUN 82 (H) 04/28/2023    CREATININE 5.05 (H) 04/28/2023       Lipids  No results found for: "CHOL"  No results found for: "HDL"  No results found for: "[address] [facility]"  No results found for: "TRIG"  No results found for: "CHOLHDL"    Hemoglobin A1C  No results found for: "HGBA1C"    Fasting Glucose  No results found for: "GLUF"    Insulin     Thyroid  No results found for: "TSH", "G2KEKNF", "G3UQZCR", "THYROIDAB"    Hepatic Function Panel  Lab Results   Component Value Date    ALT 45 04/28/2023     (H) 04/28/2023    ALKPHOS 84 04/28/2023       Celiac Disease Antibody Panel  No results found for: "ENDOMYSIAL IGA", "GLIADIN IGA", "GLIADIN IGG", "IGA", "TISSUE TRANSGLUT AB", "TTG IGA"   Iron  No results found for: "IRON", "TIBC", "FERRITIN"         Polina Godinez RD, LDN  [address] [facility] CLINICAL NUTRITION SERVICES  [address] [place] [id number]

## 2023-09-28 ENCOUNTER — CLINICAL SUPPORT (OUTPATIENT)
Dept: NUTRITION | Facility: CLINIC | Age: 83
End: 2023-09-28
Payer: MEDICARE

## 2023-09-28 VITALS — WEIGHT: 157.8 LBS | BODY MASS INDEX: 29.82 KG/M2

## 2023-09-28 DIAGNOSIS — E11.22 TYPE 2 DIABETES MELLITUS WITH STAGE 5 CHRONIC KIDNEY DISEASE NOT ON CHRONIC DIALYSIS, WITHOUT LONG-TERM CURRENT USE OF INSULIN (HCC): ICD-10-CM

## 2023-09-28 DIAGNOSIS — K85.90 ACUTE PANCREATITIS, UNSPECIFIED COMPLICATION STATUS, UNSPECIFIED PANCREATITIS TYPE: ICD-10-CM

## 2023-09-28 DIAGNOSIS — N18.5 TYPE 2 DIABETES MELLITUS WITH STAGE 5 CHRONIC KIDNEY DISEASE NOT ON CHRONIC DIALYSIS, WITHOUT LONG-TERM CURRENT USE OF INSULIN (HCC): ICD-10-CM

## 2023-09-28 PROCEDURE — 97803 MED NUTRITION INDIV SUBSEQ: CPT | Performed by: DIETITIAN, REGISTERED

## 2023-09-28 NOTE — PROGRESS NOTES
Nutrition Assessment Form    Patient Name: Anamaria Quintero    YOB: 1940    Sex: Female     Assessment Date: 9/28/2023  Start Time: 9:20 AM Stop Time: 10:05 AM Total Minutes: 45  min     Data:  Present at session: self and daughter Gaudencio Rodney   Parent/Patient Concerns/reason for visit: "I'm not sure what else to eat."    Medical Dx/Reason for Referral: Acute pancreatitis, ESRD not on dialysis, type 2 DM   Past Medical History:   Diagnosis Date   • Basal cell carcinoma     left lower eyelid   • Basal cell carcinoma     left nare   • Cataract    • Chronic kidney disease    • Colon cancer ([address])    • Diabetes mellitus ([address])    • Diverticulitis    • Foot fracture, left    • Hiatal hernia with GERD    • Psoriasis     Large hiatal hernia reportedly will not operate on this    Recent acute pancreatitis which pt reports she occasionally still has abdominal pain    Pt says possibility of starting dialysis soon though she is contemplating if she wants to do this    RRT remains not indicated at this time per nephrology note   Current Outpatient Medications   Medication Sig Dispense Refill   • allopurinol (ZYLOPRIM) 100 mg tablet Take 100 mg by mouth daily     • betamethasone dipropionate (DIPROSONE) 0.05 % ointment Apply 1 application. topically if needed     • calcipotriene (DOVONEX) 0.005 % cream Apply 1 application.  topically 2 (two) times a day Monday thru Friday     • chlordiazePOXIDE (LIBRIUM) 5 mg capsule Take 5 mg by mouth 2 (two) times a day     • cholecalciferol (VITAMIN D3) 400 units tablet Take 400 Units by mouth 2 (two) times a day     • dextromethorphan-guaifenesin (MUCINEX DM)  MG per 12 hr tablet Take 1 tablet by mouth daily as needed for cough     • EPINEPHrine (EPIPEN) 0.3 mg/0.3 mL SOAJ Inject 0.15 mg into a muscle once     • famotidine (PEPCID) 20 mg tablet Take 20 mg by mouth daily     • ferrous sulfate (ALPHONSO-IN-SOL) 75 (15 Fe) mg/mL drops Take 45 mg of iron by mouth see administration instructions Every Monday, Wednesday, Friday     • fluticasone (FLONASE) 50 mcg/act nasal spray 2 sprays into each nostril daily     • hydrochlorothiazide (HYDRODIURIL) 25 mg tablet Take 25 mg by mouth daily     • loperamide (IMODIUM) 2 mg capsule Take 2 mg by mouth as needed for diarrhea     • Magnesium Oxide POWD Take 400 mg by mouth in the morning 1/4 teaspoon daily     • nitroglycerin (NITROSTAT) 0.4 mg SL tablet Place 0.4 mg under the tongue every 5 (five) minutes as needed for chest pain     • PROBIOTIC PRODUCT PO Take 0.5 mg by mouth daily in the early morning       No current facility-administered medications for this visit. Additional Meds/Supplements: Reports BG has been controlled by diet, no DM meds   Special Learning Needs/barriers to learning/any new barriers n/a   Height: HC Readings from Last 5 Encounters:   No data found for [facility].      Weight: Wt Readings from Last 10 Encounters:   07/20/23 71.1 kg (156 lb 12.8 oz)   05/18/23 71.8 kg (158 lb 6.4 oz)   04/28/23 76.8 kg (169 lb 5 oz)     Estimated body mass index is 29.63 kg/m² as calculated from the following:    Height as of 4/28/23: 5' 1" (1.549 m). Weight as of 7/20/23: 71.1 kg (156 lb 12.8 oz). Recent Weight Change: []Yes     [x]No  Amount: Pt's weight appears stable at this time, only 2lb weight loss since initial appointment 5/18/23 158lb. Also with CKD 5, may see fluid related weight shifts.      Previous unintentional weight loss from ~167lb due to recent pancreatitis      Energy Needs: [address] Equation: 1647 kcal (mifflin x 1.5 activity)   Allergies   Allergen Reactions   • Ascorbic Acid Anaphylaxis     Reports all fresh fruit allergy   • Flurbiprofen Hives   • Iodinated Contrast Media Hives   • Shrimp (Diagnostic) - Food Allergy Hives   • Tetanus Toxoid Hives   • Food Hives     Crab meat      or intolerances Reports allergic to all kinds of fruit, reports cannot have any form of fruit    Avoids nuts due to hx diverticulitis    Reports avoiding turkey per nephrology recommendation, "reportedly lowers eGFR"     Avoids pork of any kind and roast beef "doesn't agree with me"   Social History     Substance and Sexual Activity   Alcohol Use Never    No alcohol intake   Social History     Tobacco Use   Smoking Status Never   Smokeless Tobacco Never       Who shops? daughter patience   Who cooks/cooking methods/Eating out/take out habits   patient  Cooking methods: bakes, would like to get an air fryer    No take out or dine out meals   Exercise: Performs ADLs     Other: ie: Sleep habits/ stress level/ work habits household-lives with ?/ food security Did not describe   Prior Nutritional Counseling? [x]Yes     []No  When: During recent hospitalization for pancreatitis "I can't eat honey or tomato products."     Why:         Diet Hx:  Breakfast: Diet: Low fat, small meals    Cheerios with 1% milk   Lunch: Sweet potato with chicken 4 oz and asparagus/butter  Mashed potatoes        Dinner: Chicken and dumplings with carrots       Snacks: AM -   PM - Instant pudding 1/2 cup or shortbread cookies 2  HS - another pudding or crackers and pretzels    Water is primary beverage, sometimes organic hot tea black   Other Notes/ Initial Assessment:  Pt reports BG is in the 90s every morning at home. Medardo Caldwell reports pt's A1c in December 2020 A1c 7.8%, June 29th 2021 A1c 7.1% January 23rd 2022 A1c 6.9%, July 19th 2022 A1c 7.3%. BG fairly well controlled without use of DM meds. Pt with recent pancreatitis ontop of CKD 5 not on dialysis, has fistula in her arm in the event she would need dialysis though currently not on dialysis tx. Pt says she does not know what to eat in order to balance all of these different nutritional needs.  Pt is losing weight due to not knowing what to eat and also have some abdominal pain with certain foods she ate such as whole eggs, salmon cakes, etc.     Follow up:  9/28/23: Pt brought in recent labs from Dr. Austin Juarez office, will upload to media section of pt's chart. Did discuss at previous visit variety of foods pt could try to include, says she only tried the nonfat frozen yogurt with nonfat whipped cream and says she had abdominal pain "that lasted 4 hours". Did not try other food options. Says she does not know what to eat though have been providing various options to pt upon each visit. Suspect pt mostly fearful of trying new foods from previous acute pancreatitis flare. Pts weight has maintained and BG levels are well controlled. RRT still not indicated per nephrology note. 7/20/23: Pt with slight weight decrease since initial appointment though nonsignificant. Limited variety in her diet due to not being certain what to eat with various food restrictions, prescribed a low fat, low phosphorus, low sodium diet, modest potassium, modest added sugars and also with various food intolerances/allergies. Pt brought in various food packages/labels to see if these were foods she could eat. Did have recent pancreatitis flare and is scared she will have more.         Nutrition Diagnosis:   Altered Nutrition-Related Laboratory values  related to Kidney, liver, cardiac, endocrine, neurologic, and/or pulmonary dysfunction as evidenced by  Abnormal BUN, Cr, K, Phosphorus, GFR (kidney disorders) eGFR 8 on 4/30/23       Any change or new dx since previous visit:     Medical Nutrition Therapy Intervention:  [x]Individualized Meal Plan--Discussed foods pt could include that are lower fat, no added phosphates and low in sugar like sugar free jellies, homemade tapioca or rice puddings, homemade soups, homemade cream based soups with wondraflour or regular flour to thicken instead of cream, potatoes that are peeled and soaked to reduce K content, etc. Discussed with pt since pancreatitis not in acute flare, can try foods such as chicken noodle she had when she was sick and should not be an issue, bowel rest not indicated at this time and chicken noodle is a low fat option. Pt somewhat agreeable to try now. [x]Understanding Lab Values--Reviewed A1c with pt, K level WNL at this time. []Basic Pathophysiology of Disease []Food/Medication Interactions   []Food Diary []Exercise   []Lifestyle/Behavior Modification Techniques []Medication, Mechanism of Action   [x]Label Reading: Reviewed with daughter to avoid added phosphates found in ingredients list. Would prefer sugar free options as available though given A1c 6.8% at this time and BG typically < 100 at home per pt report, sugar content not as much a concern at this time. []Self Blood Glucose Monitoring   [x]Weight/BMI Goals: Goal of weight maintenance due to pt's restricted diet, pancreatitis flares. BMI 29 appropriate given advanced age. [x]Other - handouts previously provided: Phosphorus Content of Foods, Potassium Content of Foods, Patients with CKD5 not on dialysis handout, Pancreatitis Nutrition Therapy          Comprehension: []Excellent  []Very Good  [x]Good  []Fair   []Poor    Receptivity: []Excellent  [x]Very Good  []Good  []Fair   []Poor    Expected Compliance: []Excellent  [x]Very Good  []Good  []Fair   []Poor        Goals (initial)/ Progress made on previous goals/new goals:  1. Pt to continue to maintain current weight upon follow up. 2. Pt to be able to describe 3 low phosphorus, low sodium snacks she can eat upon follow up. 3. Pt to include low sodium homemade soups (which she loves) at dinner time as desired upon follow up. No follow-ups on file.   Labs:  CMP  Lab Results   Component Value Date    K 4.2 04/28/2023     04/28/2023    CO2 20 (L) 04/28/2023    BUN 82 (H) 04/28/2023    CREATININE 5.05 (H) 04/28/2023    CALCIUM 8.8 04/28/2023     (H) 04/28/2023    ALT 45 04/28/2023    ALKPHOS 84 04/28/2023    EGFR 7 04/28/2023       BMP  Lab Results   Component Value Date    CALCIUM 8.8 04/28/2023    K 4.2 04/28/2023    CO2 20 (L) 04/28/2023     04/28/2023 BUN 82 (H) 04/28/2023    CREATININE 5.05 (H) 04/28/2023       Lipids  No results found for: "CHOL"  No results found for: "HDL"  No results found for: "LDLCALC"  No results found for: "TRIG"  No results found for: "CHOLHDL"    Hemoglobin A1C  No results found for: "HGBA1C"    Fasting Glucose  No results found for: "GLUF"    Insulin     Thyroid  No results found for: "TSH", "Q8MOFPN", "O8LOWQX", "THYROIDAB"    Hepatic Function Panel  Lab Results   Component Value Date    ALT 45 04/28/2023     (H) 04/28/2023    ALKPHOS 84 04/28/2023       Celiac Disease Antibody Panel  No results found for: "ENDOMYSIAL IGA", "GLIADIN IGA", "GLIADIN IGG", "IGA", "TISSUE TRANSGLUT AB", "TTG IGA"   Iron  No results found for: "IRON", "TIBC", "FERRITIN"         Alejandra Jovel RD, [address] [facility]  [address] [place] [address] [address] [address]